# Patient Record
Sex: MALE | Race: WHITE | Employment: OTHER | URBAN - METROPOLITAN AREA
[De-identification: names, ages, dates, MRNs, and addresses within clinical notes are randomized per-mention and may not be internally consistent; named-entity substitution may affect disease eponyms.]

---

## 2020-04-14 ENCOUNTER — VIRTUAL VISIT (OUTPATIENT)
Dept: INTERNAL MEDICINE CLINIC | Age: 35
End: 2020-04-14

## 2020-04-14 DIAGNOSIS — K21.9 GERD WITHOUT ESOPHAGITIS: ICD-10-CM

## 2020-04-14 DIAGNOSIS — F41.9 ANXIETY: ICD-10-CM

## 2020-04-14 DIAGNOSIS — Z76.89 ENCOUNTER TO ESTABLISH CARE: ICD-10-CM

## 2020-04-14 DIAGNOSIS — I86.1 LEFT VARICOCELE: Primary | ICD-10-CM

## 2020-04-14 RX ORDER — ALPRAZOLAM 0.25 MG/1
TABLET ORAL AS NEEDED
COMMUNITY
End: 2020-04-14 | Stop reason: SDUPTHER

## 2020-04-14 RX ORDER — ALPRAZOLAM 0.25 MG/1
0.25 TABLET ORAL
Qty: 10 TAB | Refills: 0 | Status: SHIPPED | OUTPATIENT
Start: 2020-04-14 | End: 2022-06-23

## 2020-04-14 RX ORDER — PANTOPRAZOLE SODIUM 40 MG/1
40 TABLET, DELAYED RELEASE ORAL DAILY
Qty: 90 TAB | Refills: 1 | Status: SHIPPED | OUTPATIENT
Start: 2020-04-14 | End: 2020-10-21

## 2020-04-14 RX ORDER — PANTOPRAZOLE SODIUM 40 MG/1
TABLET, DELAYED RELEASE ORAL
COMMUNITY
End: 2020-04-14 | Stop reason: SDUPTHER

## 2020-04-14 NOTE — PROGRESS NOTES
Ryan Keenan is a 29 y.o. male who was seen by synchronous (real-time) audio-video technology on 2020. Consent:  He and/or his healthcare decision maker is aware that this patient-initiated Telehealth encounter is a billable service, with coverage as determined by his insurance carrier. He is aware that he may receive a bill and has provided verbal consent to proceed: Yes    I was in the office while conducting this encounter. Subjective:   Ryan Keenan was seen for New Patient (Patient reports kidney problems/pain occured 3 weeks ago. Patient was having 7/10 pain in lower back area below ribs. Patient states pain has improved since then, pain is now 3/10. ) and Establish Care    No records in our EMR regarding kidney/ problems. Notes (nursing/rooming note):  Yes Reason for visit: 3 weeks [facility] problem. 1.5 weeks ago, [facility], left testicle enlarged/blood clot. Notes:  /CVA pain, prior UC and [facility] evaluations: Had pain in left CVA area--this had started/worsened 3wks ago. He now awakens with it, and improved throughout day. He scheduled appt 3wks ago. He notes UA done with [facility] 3wks ago was normal.  The pain radiated from left CVA to his pelvis--he had pain localized in his left inguinal area. He has subsequent testicular swelling and found blood clot in his testicle or scrotum. He has urology evaluation scheduled at [address] [place] [address]. [facility] was [facility]wks ago. [facility] appt with [facility] is . He notes has had improved swelling and less pain. In [facility]    He has no history of /renal problems, and no history of blood clots. He has no history of surgery or injury in that area. Reviewed [facility] evaluation and findings and evaluation/follow-up with urology. GERD:  He notes has severe GERD symptoms which is managed with Pantoprazole. He had endoscopy 6-8mo ago. He notes has FH of gastric cancer in dad, who is .     He notes will take regularly for symptoms for 3wks, then may not need for several months. Reviewed pantoprazole refill--effective for symptoms when needed. He will clarify follow-up given FH as above, with GI provider--as per instructions. Anxiety:    Prescription Monitoring Program ([place]) database query with fills:  03/31/2019  1   02/26/2019  Alprazolam 0.25 MG Tablet  40.00 [address]   [id number]   Vir (1444)   1  1.00 L[facility]  [place]   02/26/2019  1   02/26/2019  Alprazolam 0.25 MG Tablet  40.00 [address]   [id number]   Vir (1444)   0  1.00 [place]       Since only using PRN--he estimates once a week at most, and will go for up to 10wks without using, agreed to limited refill of alprazolam.  He has saw prescriber with old insurance Feb 2019, and has #2 tabs remaining from that fill. He is no longer seeing that provider currently. He reports his first use of this medication was Feb 2019 with provider above. He has no regular anxiety symptoms on a daily basis, that a controller/regular medication would be used for. He has not been on anxiety meds in past.      [facility] records:  Reviewed [facility] records electronically from provider's portal access, and printed relevant records to scan here:  ED visit on 4-2-20--eval for testicular pain. Labs with normal urinalysis--no microscopic or culture. GC, CT NAAT both negative--pt aware. Normal Chem 8:  Na 139, BUN 11, creat 0.74, Glc 101. CBC (in ED note, not labs):  WBC 5.5; Hgb 13.3; Plt 197; ANC 2.1;  ALC 2.7; AEC 0.1. Scrotal US:  No testicular mass or torsion bilat. Mild right varicocele. Partial thrombosis of large left varicocele. Reviewed above with pt at visit. Reviewed clarification with urology follow-up for his left flank pain, as didn't think this was typical of this type of presentation, and no renal US done. Reviewed normal urine findings reassuring for other renal cause.   He will review based on response/course with urology as scheduled with [facility].          ROS Complete ROS negative except as indicated in note. History reviewed. No pertinent past medical history. History reviewed. No pertinent surgical history. Family History   Problem Relation Age of Onset    No Known Problems Mother      Social History     Tobacco Use    Smoking status: Never Smoker    Smokeless tobacco: Never Used   Substance Use Topics    Alcohol use: Yes     Comment: occasional        No Known Allergies    Prior to Admission medications    Medication Sig Start Date End Date Taking? Authorizing Provider   pantoprazole (PROTONIX) 40 mg tablet pantoprazole 40 mg tablet,delayed release   Take 1 tablet every day by oral route. Yes Provider, Historical   ALPRAZolam (XANAX) 0.25 mg tablet as needed. Yes Provider, Historical     No Known Allergies      PHYSICAL EXAMINATION:    Vital Signs: There were no vitals taken for this visit. Constitutional: [x] Appears well-developed and well-nourished [x] No apparent distress      Mental status: [x] Alert and awake  [x] Oriented [x] Able to follow commands       Eyes:   EOM    [x]  Normal      Sclera  [x]  Normal              Discharge [x]  None visible       HENT: [x] Normocephalic, atraumatic    [x] Mouth/Throat: Mucous membranes are moist    External Ears [x] Normal      Neck: [x] No visualized mass     Pulmonary/Chest: [x] Respiratory effort normal   [x] No visualized signs of difficulty breathing or respiratory distress    Musculoskeletal:  [x] Normal range of motion of neck    Neurological:        [x] No Facial Asymmetry (Cranial nerve 7 motor function) (limited exam due to video visit)          [x] No gaze palsy     Skin:        [x] No significant exanthematous lesions or discoloration noted on facial skin             Psychiatric:       [x] Normal Affect       Other pertinent observable physical exam findings:  None. We discussed the expected course, resolution and complications of the diagnosis(es) in detail.   Medication risks, benefits, costs, interactions, and alternatives were discussed as indicated. I advised him to contact the office if his condition worsens, changes or fails to improve as anticipated. He expressed understanding with the diagnosis(es) and plan. Pursuant to the emergency declaration under the [facility] [place] [address] waiver authority and the [employer] Act, this Virtual  Visit was conducted, with patient's consent, to reduce the patient's risk of exposure to COVID-19 and provide continuity of care for an established patient. Services were provided through a video synchronous discussion virtually to substitute for in-person clinic visit. Assessment & Plan:   Diagnoses and all orders for this visit:      ICD-10-CM ICD-9-CM    1. Left varicocele I86.1 456.4    2. GERD without esophagitis K21.9 530.81 pantoprazole (PROTONIX) 40 mg tablet   3. Anxiety F41.9 300.00 ALPRAZolam (XANAX) 0.25 mg tablet   4. Encounter to establish care Z76.89 V65.8        1. Reviewed current plan for [facility] follow-up and to review relation to left CVA pain (referred or needs renal US/imaging) with  then. No referral needed for [address] [place] [address] urology appt per pt. 2.  Refill reviewed. Plan coordinate follow-up if needed with GI as reviewed and per instructions. 3.  Refill reviewed. Since #40 have lasted 1yr, plan #10 with monitoring as reviewed. Follow-up and Dispositions    · Return in about 3 months (around 7/14/2020) for medication follow-up, referral follow-up.       lab results and schedule of future lab studies reviewed with patient  reviewed medications and side effects in detail  radiology results and schedule of future radiology studies reviewed with patient    For additional documentation of information and/or recommendations discussed this visit, please see notes in instructions.       Plan and evaluation (above) reviewed with pt at visit  Patient voiced understanding of plan and provided with time to ask/review questions. After Visit Summary (AVS) provided to pt after visit with additional instructions as needed/reviewed--mailed to pt after visit.

## 2020-04-14 NOTE — PATIENT INSTRUCTIONS
1.  Please clarify with [facility] urology visit, if they think your left kidney/flank pain is related to abnormality on testicular ultrasound, or if they think you need to have kidney imaging with renal ultrasound. 2.  Please clarify follow-up (with your father's history of gastric cancer), with your GI provider. They may want to see you regularly to follow or evaluate this, or may have recommendations we can incorporate into follow-up here.

## 2020-04-14 NOTE — PROGRESS NOTES
Virtual Visit-video    Chief Complaint   Patient presents with    New Patient     Patient reports kidney problems/pain occured 3 weeks ago. Patient was having 7/10 pain in lower back area below ribs. Patient states pain has improved since then, pain is now 3/10.  Establish Care     1. Have you been to the ER, urgent care clinic since your last visit? Hospitalized since your last visit? Yes Reason for visit: 3 weeks ago, [facility], kidney problem. 1.5 weeks ago, [facility], left testicle enlarged/blood clot. 2. Have you seen or consulted any other health care providers outside of the [address] [place] [address] since your last visit? Include any pap smears or colon screening.  No    Health Maintenance Due   Topic Date Due    DTaP/Tdap/Td series (1 - Tdap) 08/15/2006

## 2020-10-16 DIAGNOSIS — K21.9 GERD WITHOUT ESOPHAGITIS: ICD-10-CM

## 2020-10-21 RX ORDER — PANTOPRAZOLE SODIUM 40 MG/1
40 TABLET, DELAYED RELEASE ORAL
Qty: 90 TAB | Refills: 0 | Status: SHIPPED | OUTPATIENT
Start: 2020-10-21 | End: 2022-01-26 | Stop reason: SDUPTHER

## 2020-10-21 NOTE — TELEPHONE ENCOUNTER
Refill request(s) approved--pantoprazole--90-day supply with 0 refill(s). MyChart message to pt--to schedule follow-up appt.

## 2022-01-27 ENCOUNTER — HOSPITAL ENCOUNTER (OUTPATIENT)
Dept: GENERAL RADIOLOGY | Age: 37
Discharge: HOME | End: 2022-01-27
Attending: FAMILY MEDICINE
Payer: COMMERCIAL

## 2022-01-27 DIAGNOSIS — M54.9 BACK PAIN, UNSPECIFIED BACK LOCATION, UNSPECIFIED BACK PAIN LATERALITY, UNSPECIFIED CHRONICITY: ICD-10-CM

## 2022-01-27 DIAGNOSIS — M25.552 PAIN IN JOINT OF LEFT HIP: ICD-10-CM

## 2022-01-27 PROCEDURE — 72100 X-RAY EXAM L-S SPINE 2/3 VWS: CPT

## 2022-01-27 PROCEDURE — 73502 X-RAY EXAM HIP UNI 2-3 VIEWS: CPT

## 2022-01-28 NOTE — PROGRESS NOTES
Please inform patient of normal hip and back xrays. No further recommendations at this time.     Trevor Hodge,

## 2022-02-08 ENCOUNTER — APPOINTMENT (OUTPATIENT)
Dept: PHYSICAL THERAPY | Age: 37
End: 2022-02-08
Attending: FAMILY MEDICINE

## 2022-02-09 ENCOUNTER — APPOINTMENT (OUTPATIENT)
Dept: PHYSICAL THERAPY | Age: 37
End: 2022-02-09
Attending: FAMILY MEDICINE

## 2022-03-01 ENCOUNTER — HOSPITAL ENCOUNTER (OUTPATIENT)
Dept: CT IMAGING | Age: 37
Discharge: HOME | End: 2022-03-01
Attending: FAMILY MEDICINE
Payer: MEDICAID

## 2022-03-01 DIAGNOSIS — R13.10 DYSPHAGIA, UNSPECIFIED TYPE: ICD-10-CM

## 2022-03-01 PROCEDURE — 70491 CT SOFT TISSUE NECK W/DYE: CPT

## 2022-03-01 PROCEDURE — 74011000636 HC RX REV CODE- 636: Performed by: FAMILY MEDICINE

## 2022-03-01 RX ADMIN — IOPAMIDOL 100 ML: 612 INJECTION, SOLUTION INTRAVENOUS at 08:53

## 2022-03-03 NOTE — PROGRESS NOTES
Please inform patient that there was no obvious cause for his symptoms on his neck CT, however, there was an incidental finding of a possible benign cyst at the base of his brain and the radiologist would like another look. He is recommending an MRI of the brain with IV contrast.  I do not believe there is anything really to be worried about, but we should evaluate as radiologist recommends. Please help patient arrange.

## 2022-04-08 ENCOUNTER — HOSPITAL ENCOUNTER (OUTPATIENT)
Dept: GENERAL RADIOLOGY | Age: 37
Discharge: HOME | End: 2022-04-08
Attending: FAMILY MEDICINE
Payer: MEDICAID

## 2022-04-08 DIAGNOSIS — S93.401A SPRAIN OF RIGHT ANKLE, UNSPECIFIED LIGAMENT, INITIAL ENCOUNTER: ICD-10-CM
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
## 2022-04-08 PROCEDURE — 73620 X-RAY EXAM OF FOOT: CPT

## 2022-04-08 PROCEDURE — 73610 X-RAY EXAM OF ANKLE: CPT

## 2022-04-08 NOTE — PROGRESS NOTES
Please convey results of normal ankle and foot x-ray to patient. No further recommendations at this time. Thank you! [address]

## 2022-04-18 ENCOUNTER — HOSPITAL ENCOUNTER (OUTPATIENT)
Dept: MRI IMAGING | Age: 37
Discharge: HOME | End: 2022-04-18
Attending: FAMILY MEDICINE
Payer: MEDICAID

## 2022-04-18 DIAGNOSIS — D33.2: ICD-10-CM

## 2022-04-18 PROCEDURE — A9576 INJ PROHANCE MULTIPACK: HCPCS

## 2022-04-18 PROCEDURE — 70553 MRI BRAIN STEM W/O & W/DYE: CPT

## 2022-04-18 PROCEDURE — 74011250636 HC RX REV CODE- 250/636

## 2022-04-18 RX ADMIN — GADOTERIDOL 18 ML: 279.3 INJECTION, SOLUTION INTRAVENOUS at 18:59

## 2022-04-22 ENCOUNTER — OFFICE VISIT (OUTPATIENT)
Dept: ORTHOPEDIC SURGERY | Age: 37
End: 2022-04-22
Payer: MEDICAID

## 2022-04-22 VITALS — WEIGHT: 195 LBS | HEIGHT: 72 IN | BODY MASS INDEX: 26.41 KG/M2

## 2022-04-22 DIAGNOSIS — S90.01XA CONTUSION OF RIGHT ANKLE, INITIAL ENCOUNTER: ICD-10-CM

## 2022-04-22 DIAGNOSIS — S93.401A MODERATE RIGHT ANKLE SPRAIN, INITIAL ENCOUNTER: Primary | ICD-10-CM

## 2022-04-22 DIAGNOSIS — S90.31XA CONTUSION OF RIGHT FOOT, INITIAL ENCOUNTER: ICD-10-CM

## 2022-04-22 PROCEDURE — 99203 OFFICE O/P NEW LOW 30 MIN: CPT | Performed by: ORTHOPAEDIC SURGERY

## 2022-04-22 PROCEDURE — L4387 NON-PNEUM WALK BOOT PRE OTS: HCPCS | Performed by: ORTHOPAEDIC SURGERY

## 2022-04-22 NOTE — LETTER
4/22/2022    Patient: Dottie Middleton   YOB: 1985   Date of Visit: 4/22/2022     [address] [place] [address]    Dear Inocencia Gay MD,      Thank you for referring Mr. Dottie Middleton to [facility] for evaluation. My notes for this consultation are attached. If you have questions, please do not hesitate to call me. I look forward to following your patient along with you.       Sincerely,    Javan Stover MD right knee

## 2022-06-23 ENCOUNTER — OFFICE VISIT (OUTPATIENT)
Dept: ENDOCRINOLOGY | Age: 37
End: 2022-06-23
Payer: MEDICAID

## 2022-06-23 VITALS
HEIGHT: 72 IN | HEART RATE: 58 BPM | BODY MASS INDEX: 26.98 KG/M2 | DIASTOLIC BLOOD PRESSURE: 68 MMHG | SYSTOLIC BLOOD PRESSURE: 117 MMHG | WEIGHT: 199.2 LBS

## 2022-06-23 DIAGNOSIS — D35.2 PITUITARY ADENOMA (HCC): ICD-10-CM

## 2022-06-23 DIAGNOSIS — D35.2 PITUITARY ADENOMA (HCC): Primary | ICD-10-CM

## 2022-06-23 DIAGNOSIS — E55.9 VITAMIN D DEFICIENCY: ICD-10-CM

## 2022-06-23 PROCEDURE — 99204 OFFICE O/P NEW MOD 45 MIN: CPT | Performed by: GENERAL ACUTE CARE HOSPITAL

## 2022-06-23 RX ORDER — ERGOCALCIFEROL 1.25 MG/1
50000 CAPSULE ORAL
Qty: 4 CAPSULE | Refills: 3 | Status: SHIPPED | OUTPATIENT
Start: 2022-06-23

## 2022-06-23 NOTE — LETTER
6/23/2022    Patient: Amy Elena   YOB: 1985   Date of Visit: 6/23/2022     [phone]  [address] [place]    Dear Abhay Montgomery MD,      Thank you for referring Mr. Amy Elena to [address] [place] [address] for evaluation. My notes for this consultation are attached. If you have questions, please do not hesitate to call me. I look forward to following your patient along with you.       Sincerely,    Zhane Murphy MD

## 2022-06-23 NOTE — PATIENT INSTRUCTIONS
Plan to do labs FASTING at 8 AM and based on the results I juliet give you a call to discuss further management. Pituitary microadenoma  The pituitary is an important gland near the base of the brain that makes different types of hormones, which in turn control many functions of the body. A pituitary incidentaloma is a tumor or other lesion (an area of abnormal tissue) on or near the pituitary gland. It is found when a person has an imaging test for an unrelated reason. Doctors call this an [place] finding, meaning by chance-thus, the name incidentaloma. This surprise finding is more common than you might think. In studies of adults who had head imaging with MRI (magnetic resonance imaging) or CT (computed tomography) scans for reasons other than pituitary disease, small incidentalomas were present in up to 20 percent of patients. People may find out they have this lesion when they get imaging after a neck or head injury, or because they have symptoms that are due to something else. Suddenly finding out that you have a pituitary lesion may seem scary. The good news is that these lesions are almost always benign (not cancerous) and seldom need surgery. However, they sometimes can interfere with the normal function of the pituitary gland or cause a hormone imbalance. What are the types of pituitary incidentalomas? The most common type of incidentaloma is a benign pituitary tumor called an adenoma. Other incidentalomas are benign growths near the pituitary that are not tumors. They can interfere with the gland in much the same way as tumors. These growths include congenital (present at birth) cysts called Rathke's cleft cysts and craniopharyngiomas. This guide applies the term lesion to both tumors and non-tumor growths. Some pituitary tumors are secretory, meaning they make hormones. If the tumor makes too many hormones, it is hypersecreting.  One common type of hypersecreting adenoma is a prolactinoma. This benign tumor makes too much prolactin, a hormone that stimulates breast milk production after childbirth and affects sex hormones. Other pituitary tumors are non-secretory, meaning they do not make hormones. Based on blood testing, most incidentalomas show no evidence of hormone overproduction and most are probably non-secretory tumors. Some incidentalomas cause the pituitary to make too few hormones, a condition called hypopituitarism. This happens if the lesion-whether a tumor or a non-tumor growth-presses on the normal pituitary gland. If pituitary incidentalomas are smaller than 1 centimeter (cm), which is less than 4/10 of an inch, they go by the name micro-incidentalomas. Those larger than 1 cm are macro-incidentalomas. The larger lesions are less common but are more likely to press on the pituitary or nearby tissues. The good news is that incidentalomas are almost always benign (not cancerous) and seldom need surgery. What are the symptoms of pituitary incidentalomas? Since these lesions are found by chance, most of them have not caused symptoms. Most incidentalomas grow slowly and will not grow to the extent that they cause problems. That is why doctors needed guidelines for testing and treatment of patients with these lesions. When symptoms do occur from an incidentaloma, they depend on whether the cause is pressure of the lesion (mass effect) or hormone changes (too many or too few hormones). Effects of lesion pressure include:      Headaches      Trouble seeing, mainly loss of side vision or sometimes double vision      Eyes not moving together because of paralysis or weakness of muscles that control eye movement      Pituitary apoplexy: stroke-like damage to the pituitary gland or sudden bleeding into the lesion, often causing a severe headache, vision problems, and rapid loss of pituitary function.  An uncommon complication, apoplexy is a medical emergency. All patients with an incidentaloma, even if they have no symptoms, should have a complete physical exam with an endocrinologist or other physician. Open in new tabDownload slide  All patients with an incidentaloma, even if they have no symptoms, should have a complete physical exam with an endocrinologist or other physician. Symptoms related to low pituitary hormones include:      Fatigue      Dizziness      Dry skin      Irregular periods in women      Sexual dysfunction    Symptoms of excess hormones vary widely, depending on the hormone affected. Just a few of the possible symptoms appear below. All patients with an incidentaloma, even if they have no symptoms, should have a complete physical exam with an endocrinologist or other physician. They also should have blood tests to check for abnormally high or low hormone levels. If a CT scan found your lesion, you should get an MRI if possible. This test gives a better picture of the extent of the lesion. You should have a visual field test if an MRI shows that your lesion is in a place where it could affect your vision. This includes a lesion next to the optic nerve (the nerve from each eye that carries images to the brain) or the optic chiasm (where the two optic nerves cross). A visual field test measures your full field of vision, including your peripheral (side) vision and central vision. Most patients will not need surgical removal of the lesion. Few types of non-secretory incidentalomas shrink with medical treatment, so drug therapy is not routine for this type of lesion. All patients still will need monitoring to make sure the lesion is not growing or causing health problems. Doctors sometimes call this careful observation a watch and wait approach. Your physician will tell you how often to return for visits and tests.     In general, experts recommend the following schedule:      If your lesion is smaller than 1 cm: Get another MRI 1 year after the first one. Your doctor will tell you how often to repeat MRIs after that.

## 2022-06-23 NOTE — PROGRESS NOTES
REFERRED BY: Yeimy Castrejon MD     REASON: Pituitary Adenoma    CHIEF COMPLAINT: Pituitary Adenoma    HISTORY OF PRESENT ILLNESS:   Nelly Suero is a 39 y.o. male with a PMHx as noted below who was referred to our endocrinology clinic for evaluation of a pituitary adenoma. The patient describes that they had first been evaluated by their doctor for throat pain and had imaging done and a brain lesion was seen and brain MRI done for further assessment  No prior hx of brain lesion  No specific complaints at this time, feels at baseline  More tired sometimes , Vit D def not replaced  No vision changes at this time    He has been gaining some weight, he injured his right ankle and has not been able to walk as often, no fracture     With respect to headaches or visual fields, the patient reports none  The patient denies erectile dysfunction. They deny any growth of breast tissue or secretion of milk from their breast.  They deny changes in shoe size, or ring size. They deny easily bruising or new onset hypertension and diabetes    Family history is not significant for pituitary disorders. Patient is concerned about this diagnosis and would like to proceed with evaluation. An MRI was obtained on 04/18/2022 and I have reviewed the images. MRI 04/18/2022    EXAM:  MRI BRAIN W WO CONT     INDICATION:    Concern for benign cyst of brain.     COMPARISON:  CT neck 3/1/2022.     CONTRAST: 18 ml ProHance.     TECHNIQUE:    Multiplanar multisequence acquisition without and with contrast of the brain.     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 There is mild asymmetric enlargement of the body of the  left lateral ventricle, with rightward bowing of the septum pellucidum.  There is  no periventricular edema.      In the right pituitary gland, there is a T1 hyperintense, heterogeneous T2  lesion with hypoenhancement measuring 7 x 6 mm (series 11 image 25). There is  slight leftward deviation of the infundibulum. There is no significant  suprasellar extension. The optic chiasm and cavernous sinuses are unremarkable.      The brain parenchyma otherwise has normal signal characteristics. There is no  acute infarct, hemorrhage, extra-axial fluid collection, or mass effect. There  is no cerebellar tonsillar herniation. Expected arterial flow-voids are present.     Small retention cysts in the bilateral maxillary sinuses, left larger than  right. The mastoid air cells and middle ears are clear. The orbital contents are  within normal limits. No significant osseous or scalp lesions are identified.     IMPRESSION     1. 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 2. Mild asymmetric enlargement of the body of the left lateral ventricle is  favored to represent developmental asymmetry (anatomic variant) over an  obstructive process by the colloid cyst. No periventricular edema. 3. A 7 x 6 mm hypoenhancing lesion in the right pituitary gland as above,  favored to represent a pituitary adenoma over Rathke's cleft cyst. Correlate  with pituitary hormone levels. 4. Otherwise unremarkable brain MRI.           PAST MEDICAL/SURGICAL HISTORY:   No past medical history on file. No past surgical history on file. ALLERGIES:   No Known Allergies    MEDICATIONS ON ADMISSION:     Current Outpatient Medications:     pantoprazole (PROTONIX) 40 mg tablet, Take 1 Tablet by mouth daily as needed for PRN Reason (Other) (GERD/gastritis). , Disp: 90 Tablet, Rfl: 4    sertraline (ZOLOFT) 50 mg tablet, Take 1 Tablet by mouth daily. , Disp: 90 Tablet, Rfl: 4    predniSONE (DELTASONE) 20 mg tablet, Take 60 mg by mouth daily (with breakfast). (Patient not taking: Reported on 4/22/2022), Disp: 15 Tablet, Rfl: 0    ALPRAZolam (XANAX) 0.25 mg tablet, Take 1 Tab by mouth daily as needed for Anxiety.  (Patient not taking: Reported on 4/22/2022), Disp: 10 Tab, Rfl: 0    SOCIAL HISTORY:   Social History     Socioeconomic History    Marital status: SINGLE     Spouse name: Not on file    Number of children: Not on file    Years of education: Not on file    Highest education level: Not on file   Occupational History    Not on file   Tobacco Use    Smoking status: Never Smoker    Smokeless tobacco: Never Used   Substance and Sexual Activity    Alcohol use: Yes     Comment: occasional     Drug use: Not on file    Sexual activity: Yes     Partners: Female     Birth control/protection: None   Other Topics Concern    Not on file   Social History Narrative    Not on file     Social Determinants of Health     Financial Resource Strain:     Difficulty of Paying Living Expenses: Not on file   Food Insecurity:     Worried About Running Out of Food in the Last Year: Not on file    Olivia of Food in the Last Year: Not on file   Transportation Needs:     Lack of Transportation (Medical): Not on file    Lack of Transportation (Non-Medical):  Not on file   Physical Activity:     Days of Exercise per Week: Not on file    Minutes of Exercise per Session: Not on file   Stress:     Feeling of Stress : Not on file   Social Connections:     Frequency of Communication with Friends and Family: Not on file    Frequency of Social Gatherings with Friends and Family: Not on file    Attends Oriental orthodox Services: Not on file    Active Member of [address] [place] [address] [employer] or Organizations: Not on file    Attends Club or Organization Meetings: Not on file    Marital Status: Not on file   Intimate Partner Violence:     Fear of Current or Ex-Partner: Not on file    Emotionally Abused: Not on file    Physically Abused: Not on file    Sexually Abused: Not on file   Housing Stability:     Unable to Pay for Housing in the Last Year: Not on file    Number of [place] in the Last Year: Not on file    Unstable Housing in the Last Year: Not on file       FAMILY HISTORY:  Family History   Problem Relation Age of Onset    No Known Problems Mother        REVIEW OF SYSTEMS: Complete ROS assessed and noted for that which is described above, all else are negative. Eyes: normal  ENT: normal  CVS: normal  Resp: normal  GI: normal  : normal  GYN: normal  Endocrine: normal  Integument: normal  Musculoskeletal: normal  Neuro: normal  Psych: normal      PHYSICAL EXAMINATION:    VITAL SIGNS:  There were no vitals taken for this visit. GENERAL: NCAT, Sitting comfortably, NAD  EYES: EOMI, non-icteric, visual fields intact on examination  Ear/Nose/Throat: NCAT, no inflammation, no masses  LYMPH NODES: No LAD  CARDIOVASCULAR: S1 S2, RRR, No murmur, 2+ radial pulses  RESPIRATORY: CTA b/l, no wheeze/rales  GASTROINTESTINAL:  NT, ND,  MUSCULOSKELETAL: Normal ROM, no atrophy  SKIN: warm, no edema/rash/ or other skin changes  NEUROLOGIC: 5/5 power all extremities, no tremors, AAOx3  PSYCHIATRIC: Normal affect, Normal insight and judgement         REVIEW OF LABORATORY AND RADIOLOGY DATA:   Labs and documentation have been reviewed as described above. ASSESSMENT AND PLAN:   Colten De Leon is a 39 y.o. male with a PMHx as noted above who was referred to our endocrinology clinic for evaluation of a pituitary adenoma. Pituitary Microadenoma    Today we have spent time discussing the pituitary axis and how it can affect various endocrine gland function. Using the white board I was able to illustrate the concerns of pituitary adenoma with respect to mass effect and also due to hormone-hormone interactions. With respect to the patients microoadenoma 7mm and no symptoms at this time. At this time it is important to further assess for that status of pituitary function due to the critical conditions that can result from such deficiency. Laboratory Evaluation:  Thyroid: We will check a TSH and a FT4 to assess the thyroid-pituitary axis. It will be important to replace thyroid hormone in the setting of deficiency.     Adrenal:  We will check an AM cortisol level to assess for adequate adrenal function. This may require a f/u evaluation with an ACTH stimulation test if suggestively low. Gonadal Function:  Based on todays discussion, we will evaluate for proper gonadal function by checking an LH, FSH, and Testosterone Panel. Growth Hormone: We will check an IGF-1 level as unrecognized growth hormone oversecretion by pituitary adenoma's can be a cause of significant morbidity. Growth hormone can be co-secreted with prolactin in some instances. Furthermore a deficiency can be present which may explain some of their symptoms. Imaging:  Based on the recent MRI findings, I recommend an initial f/u MRI to be 1 year from the previous one to assess for any changes in growth or if patient develops any new symptoms. Eyes: No need for formal visual field testing at this time due to the small size of the adenoma and the absence of visual symptoms, however the patient is advised to notify us of any new visual disturbances. We will plan for a call after results and any necessary treatments will be discussed based upon the results of their blood work.     Vitamin D def  He did not receive replacement for Vit D def and he is feeling fatigued, will send for weekly replacement        Roshni Serrano MD   [facility]

## 2022-06-28 LAB
CORTIS AM PEAK SERPL-MCNC: 9.9 UG/DL (ref 6.2–19.4)
FSH SERPL-ACNC: 11.1 MIU/ML (ref 1.5–12.4)
IGF-I SERPL-MCNC: 163 NG/ML (ref 90–278)
LH SERPL-ACNC: 7.4 MIU/ML (ref 1.7–8.6)
PROLACTIN SERPL-MCNC: 9 NG/ML (ref 4–15.2)
T4 FREE SERPL-MCNC: 1.11 NG/DL (ref 0.82–1.77)
TESTOST FREE SERPL-MCNC: 10 PG/ML (ref 8.7–25.1)
TESTOST SERPL-MCNC: 623 NG/DL (ref 264–916)
TSH SERPL DL<=0.005 MIU/L-ACNC: 1.03 UIU/ML (ref 0.45–4.5)

## 2022-07-06 ENCOUNTER — TELEPHONE (OUTPATIENT)
Dept: ENDOCRINOLOGY | Age: 37
End: 2022-07-06

## 2022-07-06 NOTE — TELEPHONE ENCOUNTER
----- Message from Shruthi Curran MD sent at 7/6/2022  9:17 AM EDT -----  Normal pituitary function tests, plan to repeat pituitary MRI in 1 year

## 2022-07-06 NOTE — TELEPHONE ENCOUNTER
7/6/2022    Pt called and left a vm at 10:07 am stating he is returning the nurse's phone call. Pt can be reached at [phone].     Thanks,   Kizzy Escudero

## 2022-07-06 NOTE — TELEPHONE ENCOUNTER
----- Message from Maliha Wu MD sent at 7/6/2022  9:17 AM EDT -----  Normal pituitary function tests, plan to repeat pituitary MRI in 1 year

## 2022-09-21 NOTE — PROGRESS NOTES
Aan Jaquez (: 1985) is a 39 y.o. male, patient,here for evaluation of the following   Chief Complaint   Patient presents with    Ankle Injury     right ankle injury, that happened when he was swinging on a vine and it broke and he fell with it on 3/25/22 he was seen by Dr. Elige Aschoff        ASSESSMENT/PLAN:  Below is the assessment and plan developed based on review of pertinent history, physical exam, labs, studies, and medications. 1. Moderate right ankle sprain, initial encounter  -     REFERRAL TO PHYSICAL THERAPY  -     REFERRAL TO DME  -     MS NON-PNEUM WALK BOOT PRE OTS  2. Contusion of right ankle, initial encounter  -     REFERRAL TO PHYSICAL THERAPY  -     REFERRAL TO DME  -     MS NON-PNEUM WALK BOOT PRE OTS  3. Contusion of right foot, initial encounter  -     REFERRAL TO PHYSICAL THERAPY  -     REFERRAL TO DME  -     MS NON-PNEUM WALK BOOT PRE OTS    Based on exam there was a moderate ankle sprain and likely contusion to both the ankle and the foot from the trauma impact to the ground. This kind of injury can have persistent pain for at least 8 to 12 weeks sometimes contusions can be painful for over 3 months. I provided patient information about this injury and likely areas of injury to the calcaneus likely contusion, to the talus and possibly the joint contusion and over time he may develop some arthritis due to this injury but some of this may be years down the line. In terms of the tendons and ligaments, they are injured and are tender and if there is some tears that do not heal especially at the tendons, he may have persistent symptoms and in that case I generally will order other studies if needed if the symptoms are still significant over 3 months from now. He will continue use of crutches which he already has. He has a brace but I also recommended a boot instead because he does have foot pain and he would do better in a tall boot.   He can also start progressing with weightbearing as tolerated in the boot. He has been referred to physical therapist and I think that is a good start and he can start range of motion and stretching and strengthening exercises and learn how to walk in a boot without assistance. I have also provided referral today to provide to therapist he is already scheduled an appointment with. I will see him again in approximately 4 to 6 weeks, we will hold on x-rays unless there is still enough pain at either the foot or ankle and then we may obtain some x-rays if needed but that would be only after evaluation first.  Patient will take over-the-counter pain medications as needed, he does not prefer prescribe medications      Return in about 4 weeks (around 5/20/2022). No Known Allergies    Current Outpatient Medications   Medication Sig    pantoprazole (PROTONIX) 40 mg tablet Take 1 Tablet by mouth daily as needed for PRN Reason (Other) (GERD/gastritis).  sertraline (ZOLOFT) 50 mg tablet Take 1 Tablet by mouth daily.  predniSONE (DELTASONE) 20 mg tablet Take 60 mg by mouth daily (with breakfast). (Patient not taking: Reported on 4/22/2022)    ALPRAZolam (XANAX) 0.25 mg tablet Take 1 Tab by mouth daily as needed for Anxiety. (Patient not taking: Reported on 4/22/2022)     No current facility-administered medications for this visit. History reviewed. No pertinent past medical history. History reviewed. No pertinent surgical history.     Family History   Problem Relation Age of Onset    No Known Problems Mother        Social History     Socioeconomic History    Marital status: SINGLE     Spouse name: Not on file    Number of children: Not on file    Years of education: Not on file    Highest education level: Not on file   Occupational History    Not on file   Tobacco Use    Smoking status: Never Smoker    Smokeless tobacco: Never Used   Substance and Sexual Activity    Alcohol use: Yes     Comment: occasional     Drug use: Not on file    Sexual activity: Yes     Partners: Female     Birth control/protection: None   Other Topics Concern    Not on file   Social History Narrative    Not on file     Social Determinants of Health     Financial Resource Strain:     Difficulty of Paying Living Expenses: Not on file   Food Insecurity:     Worried About Running Out of Food in the Last Year: Not on file    Olivia of Food in the Last Year: Not on file   Transportation Needs:     Lack of Transportation (Medical): Not on file    Lack of Transportation (Non-Medical): Not on file   Physical Activity:     Days of Exercise per Week: Not on file    Minutes of Exercise per Session: Not on file   Stress:     Feeling of Stress : Not on file   Social Connections:     Frequency of Communication with Friends and Family: Not on file    Frequency of Social Gatherings with Friends and Family: Not on file    Attends Islam Services: Not on file    Active Member of [address] [place] [address] [employer] or Organizations: Not on file    Attends Club or Organization Meetings: Not on file    Marital Status: Not on file   Intimate Partner Violence:     Fear of Current or Ex-Partner: Not on file    Emotionally Abused: Not on file    Physically Abused: Not on file    Sexually Abused: Not on file   Housing Stability:     Unable to Pay for Housing in the Last Year: Not on file    Number of [place] in the Last Year: Not on file    Unstable Housing in the Last Year: Not on file           Vitals:  Ht 6' (1.829 m)   Wt 195 lb (88.5 kg)   BMI 26.45 kg/m²    Body mass index is 26.45 kg/m². SUBJECTIVE/OBJECTIVE:  Levy Adkins (: 1985)   New patient presents today with complaint of right ankle pain related to an injury sustained on 2022, he was swinging on a Vine and landed onto his foot and ankle and when he landed extremely hard onto the ground his ankle twisted in an inversion direction. He was about 7 to 8 feet above ground when this happened.   He 2 had immediate swelling and pain and difficulty walking. [place] at time of this injury and when he returned to the area, [facility]. X-rays were obtained which did not show an obvious fracture or dislocation. Eventually he saw another physician Dr. Manuel Felix on April 8, 2022, who obtained x-rays for the right ankle and foot again through the [facility] system and these were also read as negative. He continues to have some pain and swelling and he is using crutches. He is otherwise healthy male currently describes moderate dull pain that comes and goes still some swelling and bruising and standing makes it worse. He is taking ibuprofen when needed. Physical Exam  Pleasant well-nourished male , alert and oriented to person, time and place, no acute distress. Nonweightbearing gait, limited weightbearing stance. Right lower extremity/ankle: Still circumferential swelling around the ankle although no ecchymosis, erythema or fluctuance. There is no gross instability for anterior drawer and lateral talar tilt stress, there is diffuse tenderness around the ankle around the peroneal tendons, posterior tibial tendons, mild over the medial malleolus but not lateral malleolus, tender at the ATFL, CFL, deltoid ligaments. Syndesmosis is also mildly tender, extreme dorsiflexion does result in some discomfort. No tenderness at the Achilles tendon, the tendon is intact with a negative Skinner test, there is a negative ankle squeeze test.  There is also some tenderness to the joint anteriorly. Calves are soft nontender and the proximal tibia and fibula nontender. Right foot: There is tenderness with calcaneal squeeze although not severe, there is diffuse tenderness along the lateral hindfoot, and midfoot, forefoot is nontender and able to flex and extend all toes with good range of motion strength.   No malalignment or deformities, no tenderness at the Lisfranc joint, base of fifth metatarsals, navicular or cuboid. Contralateral foot and ankle exam, nontender, no swelling ligaments grossly stable. Normal weightbearing stance. Neurovascular exam intact for light touch sensation, cap refill, dorsalis pedis pulse palpable, flexion/extension strength 5/5. Skin intact without open wounds, lesions or ulcers, no skin discolorations, normal warmth to skin. Imaging:    Reviewed all the x-rays on PACS on the [address] [place] [address] [employer] [facility] dated April 8, 2022, they are of the right ankle and foot 3 views nonweightbearing x-rays, do not show any acute fractures or dislocations. At the ankle there is a normal ankle mortise and joint space in the syndesmosis space is also intact. At the foot there is a normal Lisfranc joint space, no fractures at the metatarsals, cuboid or navicular. No significant joint arthritis seen. XR Results (maximum last 2): Results from [place] encounter on 04/08/22    XR FOOT RT AP/LAT    Narrative  INDICATION: Sprain of unspecified ligament of right ankle, initial encounter. Foot pain. FINDINGS: 3 views of the right ankle and 3 additional views of the right foot  demonstrate no evidence of acute fracture or dislocation. There is mild diffuse  soft tissue swelling about the ankle. Impression  No evidence of fracture or dislocation. Mild soft tissue swelling  about the ankle. XR ANKLE RT MIN 3 V    Narrative  INDICATION: Sprain of unspecified ligament of right ankle, initial encounter. Foot pain. FINDINGS: 3 views of the right ankle and 3 additional views of the right foot  demonstrate no evidence of acute fracture or dislocation. There is mild diffuse  soft tissue swelling about the ankle. Impression  No evidence of fracture or dislocation. Mild soft tissue swelling  about the ankle. An electronic signature was used to authenticate this note.   -- Oneyda Lopez MD

## 2022-12-05 ENCOUNTER — TELEPHONE (OUTPATIENT)
Dept: ENDOCRINOLOGY | Age: 37
End: 2022-12-05

## 2022-12-05 NOTE — TELEPHONE ENCOUNTER
12/5/2022  1:42 PM    Pt called and left message at 1.24 stating that he was returning venkat's call.   Please call him back at [phone]

## 2022-12-25 VITALS
DIASTOLIC BLOOD PRESSURE: 73 MMHG | RESPIRATION RATE: 18 BRPM | OXYGEN SATURATION: 99 % | TEMPERATURE: 97.5 F | HEART RATE: 66 BPM | SYSTOLIC BLOOD PRESSURE: 122 MMHG

## 2022-12-25 PROCEDURE — 99282 EMERGENCY DEPT VISIT SF MDM: CPT

## 2022-12-26 ENCOUNTER — HOSPITAL ENCOUNTER (EMERGENCY)
Age: 37
Discharge: HOME | End: 2022-12-26
Attending: EMERGENCY MEDICINE
Payer: MEDICAID

## 2022-12-26 DIAGNOSIS — S61.209A AVULSION OF FINGER TIP, INITIAL ENCOUNTER: Primary | ICD-10-CM

## 2022-12-26 NOTE — DISCHARGE INSTRUCTIONS
Return for new or worsening symptoms. Leave dressing in place until Thursday. Remove dressing, gently rinse with cool water, apply bacitracin ointment, cover with non-stick pad. Repeat every other day until fully healed.

## 2022-12-26 NOTE — ED PROVIDER NOTES
40year old ARACELIS DAMON presenting to the ED for thumb laceration. Tetanus: Patient relatively certain that his tetanus is within 5 years. Patient notes that shortly prior to arrival he was cutting some collards with a brand new 's knife when he cut the tip of his LEFT thumb. Moderate pain with palpation. Unable to control bleeding at home. No other concerns. PMHx: denies  PSx: denies  Social: non-smoker. . The history is provided by the patient. Laceration        No past medical history on file. No past surgical history on file. Family History:   Problem Relation Age of Onset    No Known Problems Mother        Social History     Socioeconomic History    Marital status: SINGLE     Spouse name: Not on file    Number of children: Not on file    Years of education: Not on file    Highest education level: Not on file   Occupational History    Not on file   Tobacco Use    Smoking status: Never    Smokeless tobacco: Never   Substance and Sexual Activity    Alcohol use: Yes     Comment: occasional     Drug use: Not on file    Sexual activity: Yes     Partners: Female     Birth control/protection: None   Other Topics Concern    Not on file   Social History Narrative    Not on file     Social Determinants of Health     Financial Resource Strain: Not on file   Food Insecurity: Not on file   Transportation Needs: Not on file   Physical Activity: Not on file   Stress: Not on file   Social Connections: Not on file   Intimate Partner Violence: Not on file   Housing Stability: Not on file         ALLERGIES: Patient has no known allergies. Review of Systems   Constitutional:  Negative for fever. HENT:  Negative for facial swelling. Respiratory:  Negative for shortness of breath. Cardiovascular:  Negative for chest pain. Gastrointestinal:  Negative for vomiting. Skin:  Positive for wound. Neurological:  Negative for syncope.    All other systems reviewed and are negative. Vitals:    12/25/22 2330   BP: 122/73   Pulse: 66   Resp: 18   Temp: 97.5 °F (36.4 °C)   SpO2: 99%            Physical Exam  Vitals and nursing note reviewed. Constitutional:       Appearance: He is well-developed. HENT:      Head: Normocephalic. Eyes:      Conjunctiva/sclera: Conjunctivae normal.   Cardiovascular:      Rate and Rhythm: Normal rate. Pulmonary:      Effort: Pulmonary effort is normal. No respiratory distress. Musculoskeletal:         General: Normal range of motion. Cervical back: Neck supple. Comments: 3mm diameter circular avulsion to the tip of the LEFT thumb with moderate active bleeding   Skin:     General: Skin is warm and dry. Neurological:      Mental Status: He is alert and oriented to person, place, and time. MDM  Number of Diagnoses or Management Options  Avulsion of finger tip, initial encounter  Diagnosis management comments: 59-year-old male presenting to the ED for left thumb tip laceration sustained when using a new  knife, patient removed a small piece of tissue but unable to get the bleeding controlled at home. No significant loss of curvature of the thumb tip. Hemostasis achieved using tiny piece of Surgifoam.  Discussed supportive care at home. Amount and/or Complexity of Data Reviewed  Discuss the patient with other providers: yes (Dr. Kaylan Jensen ED attending)           Other Procedure    Date/Time: 12/26/2022 12:57 AM  Performed by: ANDREW Portillo  Authorized by: Rafael Camargo, [id number]     Procedure specific details:      Wound rinsed under the sink for a couple of minutes. Small piece of Surgifoam applied, hemostasis achieved.   Xeroform dressing applied over top, RN to apply bulky dressing

## 2022-12-26 NOTE — ED TRIAGE NOTES
Pt arrives ambulatory from home for a cut on the left thumb. He states he was cooking and accidentally cut the tip of his thumb with a brand new knife. The bleeding is still active but controlled with pressure. Wound dressed in triage. He states his last tetanus booster was 3 years ago. He is not on blood thinners. A&OX4.

## 2023-03-01 DIAGNOSIS — E55.9 VITAMIN D DEFICIENCY: ICD-10-CM

## 2023-03-01 DIAGNOSIS — D35.2 PITUITARY ADENOMA (HCC): ICD-10-CM

## 2023-03-03 RX ORDER — ERGOCALCIFEROL 1.25 MG/1
CAPSULE ORAL
Qty: 4 CAPSULE | Refills: 0 | Status: SHIPPED | OUTPATIENT
Start: 2023-03-03

## 2023-03-23 DIAGNOSIS — D35.2 PITUITARY ADENOMA (HCC): ICD-10-CM

## 2023-03-23 DIAGNOSIS — E55.9 VITAMIN D DEFICIENCY: ICD-10-CM

## 2023-03-24 RX ORDER — ACETAMINOPHEN 500 MG
2000 TABLET ORAL DAILY
Qty: 90 CAPSULE | Refills: 1 | Status: SHIPPED | OUTPATIENT
Start: 2023-03-24

## 2023-05-19 RX ORDER — PANTOPRAZOLE SODIUM 40 MG/1
40 TABLET, DELAYED RELEASE ORAL DAILY PRN
COMMUNITY
Start: 2023-02-01